# Patient Record
Sex: FEMALE | Race: OTHER | NOT HISPANIC OR LATINO
[De-identification: names, ages, dates, MRNs, and addresses within clinical notes are randomized per-mention and may not be internally consistent; named-entity substitution may affect disease eponyms.]

---

## 2024-01-04 ENCOUNTER — APPOINTMENT (OUTPATIENT)
Dept: HEART AND VASCULAR | Facility: CLINIC | Age: 54
End: 2024-01-04
Payer: COMMERCIAL

## 2024-01-04 VITALS
SYSTOLIC BLOOD PRESSURE: 154 MMHG | BODY MASS INDEX: 23.31 KG/M2 | HEIGHT: 64 IN | WEIGHT: 136.5 LBS | DIASTOLIC BLOOD PRESSURE: 100 MMHG | HEART RATE: 92 BPM | OXYGEN SATURATION: 98 %

## 2024-01-04 VITALS — DIASTOLIC BLOOD PRESSURE: 90 MMHG | SYSTOLIC BLOOD PRESSURE: 137 MMHG

## 2024-01-04 VITALS — SYSTOLIC BLOOD PRESSURE: 134 MMHG | DIASTOLIC BLOOD PRESSURE: 98 MMHG

## 2024-01-04 DIAGNOSIS — Z85.850 PERSONAL HISTORY OF MALIGNANT NEOPLASM OF THYROID: ICD-10-CM

## 2024-01-04 DIAGNOSIS — I10 ESSENTIAL (PRIMARY) HYPERTENSION: ICD-10-CM

## 2024-01-04 DIAGNOSIS — E78.5 HYPERLIPIDEMIA, UNSPECIFIED: ICD-10-CM

## 2024-01-04 PROBLEM — Z00.00 ENCOUNTER FOR PREVENTIVE HEALTH EXAMINATION: Status: ACTIVE | Noted: 2024-01-04

## 2024-01-04 PROCEDURE — 93000 ELECTROCARDIOGRAM COMPLETE: CPT

## 2024-01-04 PROCEDURE — 99205 OFFICE O/P NEW HI 60 MIN: CPT | Mod: 25

## 2024-01-04 PROCEDURE — 36415 COLL VENOUS BLD VENIPUNCTURE: CPT

## 2024-01-04 RX ORDER — LEVOTHYROXINE SODIUM 137 UG/1
TABLET ORAL
Refills: 0 | Status: ACTIVE | COMMUNITY

## 2024-01-04 RX ORDER — NIFEDIPINE 30 MG
30 TABLET, EXTENDED RELEASE ORAL
Refills: 0 | Status: DISCONTINUED | COMMUNITY
End: 2024-01-04

## 2024-01-04 NOTE — HISTORY OF PRESENT ILLNESS
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

## 2024-01-04 NOTE — PHYSICAL EXAM
[Well Developed] : well developed [Well Nourished] : well nourished [No Acute Distress] : no acute distress [Normal Conjunctiva] : normal conjunctiva [Normal Venous Pressure] : normal venous pressure [No Carotid Bruit] : no carotid bruit [Normal S1, S2] : normal S1, S2 [No Rub] : no rub [No Gallop] : no gallop [Clear Lung Fields] : clear lung fields [Good Air Entry] : good air entry [No Respiratory Distress] : no respiratory distress  [Soft] : abdomen soft [Non Tender] : non-tender [No Masses/organomegaly] : no masses/organomegaly [Normal Bowel Sounds] : normal bowel sounds [Normal Gait] : normal gait [No Edema] : no edema [No Cyanosis] : no cyanosis [No Clubbing] : no clubbing [No Varicosities] : no varicosities [No Rash] : no rash [No Skin Lesions] : no skin lesions [Moves all extremities] : moves all extremities [No Focal Deficits] : no focal deficits [Normal Speech] : normal speech [Alert and Oriented] : alert and oriented [Normal memory] : normal memory [de-identified] : 2/6 raudel quintero

## 2024-01-04 NOTE — DISCUSSION/SUMMARY
[Patient] : the patient [EKG obtained to assist in diagnosis and management of assessed problem(s)] : EKG obtained to assist in diagnosis and management of assessed problem(s) [___ Week(s)] : in [unfilled] week(s) [FreeTextEntry1] : 54 y/o female with h/o hl, thyroid cancer s/p thyroidectomy, htn,family h/o early cvd who presents for initial evaluation today  -ordered ekg today -  nsr, possible lvh, no st/t changes -will review recent testing from outside cards office -ordered calcium score -ordered echo to f/up valve disease -Stress echo 2022 - wnl -Echo 2022: ef 60-65%, grade 1 diastolic dysfunction, mild ai/mr, trace tr -continue chlorthalidone, dc nifedipine, start norvasc and telmisartan -labs ordered today -agree w endo eval for thryoid, on synthroid now -counseled on cvd risk factors -f/up 2-3 weeks for bmp, bp  I have spent 60 minutes reviewing labs, records, tsts and discussed cvd risk factors, htn

## 2024-01-07 LAB
ALBUMIN SERPL ELPH-MCNC: 4.6 G/DL
ALP BLD-CCNC: 99 U/L
ALT SERPL-CCNC: 34 U/L
ANION GAP SERPL CALC-SCNC: 12 MMOL/L
APPEARANCE: ABNORMAL
AST SERPL-CCNC: 24 U/L
BACTERIA: NEGATIVE /HPF
BASOPHILS # BLD AUTO: 0 K/UL
BASOPHILS NFR BLD AUTO: 0 %
BILIRUB SERPL-MCNC: 0.4 MG/DL
BILIRUBIN URINE: NEGATIVE
BLOOD URINE: NEGATIVE
BUN SERPL-MCNC: 19 MG/DL
CALCIUM SERPL-MCNC: 9.8 MG/DL
CAST: 2 /LPF
CHLORIDE SERPL-SCNC: 98 MMOL/L
CHOLEST SERPL-MCNC: 217 MG/DL
CO2 SERPL-SCNC: 29 MMOL/L
COLOR: YELLOW
CREAT SERPL-MCNC: 0.87 MG/DL
CREAT SPEC-SCNC: 50 MG/DL
EGFR: 81 ML/MIN/1.73M2
EOSINOPHIL # BLD AUTO: 0 K/UL
EOSINOPHIL NFR BLD AUTO: 0 %
EPITHELIAL CELLS: 1 /HPF
ESTIMATED AVERAGE GLUCOSE: 105 MG/DL
GLUCOSE QUALITATIVE U: NEGATIVE MG/DL
GLUCOSE SERPL-MCNC: 97 MG/DL
HBA1C MFR BLD HPLC: 5.3 %
HCT VFR BLD CALC: 39.5 %
HDLC SERPL-MCNC: 68 MG/DL
HGB BLD-MCNC: 14 G/DL
IMM GRANULOCYTES NFR BLD AUTO: 0.2 %
KETONES URINE: NEGATIVE MG/DL
LDLC SERPL CALC-MCNC: 127 MG/DL
LEUKOCYTE ESTERASE URINE: NEGATIVE
LYMPHOCYTES # BLD AUTO: 1.82 K/UL
LYMPHOCYTES NFR BLD AUTO: 33.5 %
MAGNESIUM SERPL-MCNC: 2.1 MG/DL
MAN DIFF?: NORMAL
MCHC RBC-ENTMCNC: 31.7 PG
MCHC RBC-ENTMCNC: 35.4 GM/DL
MCV RBC AUTO: 89.6 FL
MICROALBUMIN 24H UR DL<=1MG/L-MCNC: <1.2 MG/DL
MICROALBUMIN/CREAT 24H UR-RTO: NORMAL MG/G
MICROSCOPIC-UA: NORMAL
MONOCYTES # BLD AUTO: 0.44 K/UL
MONOCYTES NFR BLD AUTO: 8.1 %
NEUTROPHILS # BLD AUTO: 3.17 K/UL
NEUTROPHILS NFR BLD AUTO: 58.2 %
NITRITE URINE: NEGATIVE
NONHDLC SERPL-MCNC: 149 MG/DL
PH URINE: 7.5
PLATELET # BLD AUTO: 220 K/UL
POTASSIUM SERPL-SCNC: 4.3 MMOL/L
PROT SERPL-MCNC: 8.1 G/DL
PROTEIN URINE: NEGATIVE MG/DL
RBC # BLD: 4.41 M/UL
RBC # FLD: 12.8 %
RED BLOOD CELLS URINE: 1 /HPF
SODIUM SERPL-SCNC: 139 MMOL/L
SPECIFIC GRAVITY URINE: 1.01
TRIGL SERPL-MCNC: 128 MG/DL
TSH SERPL-ACNC: 1.1 UIU/ML
UROBILINOGEN URINE: 0.2 MG/DL
WBC # FLD AUTO: 5.44 K/UL
WHITE BLOOD CELLS URINE: 0 /HPF

## 2024-01-08 LAB — APO LP(A) SERPL-MCNC: 148.4 NMOL/L

## 2024-01-09 ENCOUNTER — RX RENEWAL (OUTPATIENT)
Age: 54
End: 2024-01-09

## 2024-01-19 ENCOUNTER — APPOINTMENT (OUTPATIENT)
Dept: HEART AND VASCULAR | Facility: CLINIC | Age: 54
End: 2024-01-19
Payer: COMMERCIAL

## 2024-01-19 VITALS — OXYGEN SATURATION: 99 % | DIASTOLIC BLOOD PRESSURE: 85 MMHG | SYSTOLIC BLOOD PRESSURE: 132 MMHG | HEART RATE: 68 BPM

## 2024-01-19 DIAGNOSIS — I35.1 NONRHEUMATIC AORTIC (VALVE) INSUFFICIENCY: ICD-10-CM

## 2024-01-19 PROCEDURE — 93306 TTE W/DOPPLER COMPLETE: CPT

## 2024-01-22 PROBLEM — I35.1 MILD AORTIC INSUFFICIENCY: Status: ACTIVE | Noted: 2024-01-22

## 2024-01-25 ENCOUNTER — RX RENEWAL (OUTPATIENT)
Age: 54
End: 2024-01-25

## 2024-01-25 ENCOUNTER — APPOINTMENT (OUTPATIENT)
Dept: HEART AND VASCULAR | Facility: CLINIC | Age: 54
End: 2024-01-25
Payer: COMMERCIAL

## 2024-01-25 VITALS
WEIGHT: 138 LBS | HEIGHT: 64 IN | DIASTOLIC BLOOD PRESSURE: 87 MMHG | BODY MASS INDEX: 23.56 KG/M2 | HEART RATE: 75 BPM | OXYGEN SATURATION: 98 % | SYSTOLIC BLOOD PRESSURE: 124 MMHG

## 2024-01-25 PROCEDURE — 99214 OFFICE O/P EST MOD 30 MIN: CPT | Mod: 25

## 2024-01-25 PROCEDURE — 93000 ELECTROCARDIOGRAM COMPLETE: CPT

## 2024-01-25 PROCEDURE — 36415 COLL VENOUS BLD VENIPUNCTURE: CPT

## 2024-01-26 LAB
ANION GAP SERPL CALC-SCNC: 11 MMOL/L
BUN SERPL-MCNC: 24 MG/DL
CALCIUM SERPL-MCNC: 9.1 MG/DL
CHLORIDE SERPL-SCNC: 100 MMOL/L
CO2 SERPL-SCNC: 28 MMOL/L
CREAT SERPL-MCNC: 0.88 MG/DL
EGFR: 80 ML/MIN/1.73M2
GLUCOSE SERPL-MCNC: 108 MG/DL
POTASSIUM SERPL-SCNC: 3.7 MMOL/L
SODIUM SERPL-SCNC: 139 MMOL/L

## 2024-02-02 ENCOUNTER — RESULT REVIEW (OUTPATIENT)
Age: 54
End: 2024-02-02

## 2024-02-08 RX ORDER — ASPIRIN ENTERIC COATED TABLETS 81 MG 81 MG/1
81 TABLET, DELAYED RELEASE ORAL DAILY
Qty: 90 | Refills: 3 | Status: ACTIVE | COMMUNITY
Start: 2024-02-08 | End: 1900-01-01

## 2024-02-12 ENCOUNTER — RESULT REVIEW (OUTPATIENT)
Age: 54
End: 2024-02-12

## 2024-02-12 ENCOUNTER — APPOINTMENT (OUTPATIENT)
Dept: HEART AND VASCULAR | Facility: CLINIC | Age: 54
End: 2024-02-12
Payer: COMMERCIAL

## 2024-02-12 VITALS
DIASTOLIC BLOOD PRESSURE: 72 MMHG | SYSTOLIC BLOOD PRESSURE: 147 MMHG | HEIGHT: 64 IN | TEMPERATURE: 97.8 F | OXYGEN SATURATION: 98 % | HEART RATE: 73 BPM | BODY MASS INDEX: 23.56 KG/M2 | WEIGHT: 138 LBS

## 2024-02-12 VITALS — DIASTOLIC BLOOD PRESSURE: 81 MMHG | SYSTOLIC BLOOD PRESSURE: 128 MMHG

## 2024-02-12 VITALS — DIASTOLIC BLOOD PRESSURE: 88 MMHG | SYSTOLIC BLOOD PRESSURE: 135 MMHG

## 2024-02-12 VITALS — DIASTOLIC BLOOD PRESSURE: 80 MMHG | SYSTOLIC BLOOD PRESSURE: 132 MMHG

## 2024-02-12 PROCEDURE — 99214 OFFICE O/P EST MOD 30 MIN: CPT

## 2024-02-12 NOTE — DISCUSSION/SUMMARY
[Patient] : the patient [___ Month(s)] : in [unfilled] month(s) [FreeTextEntry1] : 52 y/o female with h/o hl, thyroid cancer s/p thyroidectomy, htn, family h/o early cvd, pad who presents for f/up today  -secondary w/up htn labs ordered -SEBASTIÁN duplex ordered -ike evaluation referral -Calcium score 2/24 IMPRESSION: Cardiac: 1.  The calcium score is 0   Agatston units . 2.  Mild aortic calcification. Non-cardiac: No significant abnormality seen.  -given pad, elevated LpA and  and family h/o cvd would continue asa, statin -will need lipids 5/24 -Echo 1/24 1. Left ventricular cavity is normal. Left ventricular systolic function is normal with an ejection fraction of 66 % by Ugarte's method of disks. There are no regional wall motion abnormalities seen. 2. Normal left ventricular diastolic function. 3. Normal right ventricular cavity size, wall thickness, and normal systolic function. 4. Mild left ventricular hypertrophy. 5. Mild mitral regurgitation. 6. Mild to moderate aortic regurgitation. 7. No pericardial effusion seen.  -serial echo to f/up AR 1/25 -ekg 1/24 - nsr, lad, possible lvh, no st/t changes -Stress echo 2022 - wnl -Echo 2022: ef 60-65%, grade 1 diastolic dysfunction, mild ai/mr, trace tr -continue chlorthalidone, norvasc and telmisartan  -labs 2024 reviewed, bmp ordered -elevated LpA -agree w endo eval for thryoid, on synthroid now -counseled on cvd risk factors -f/up 2-3 months for lipids, htn  I have spent 30 minutes reviewing labs, records, tests and discussed cvd risk factors, htn.

## 2024-02-12 NOTE — HISTORY OF PRESENT ILLNESS
[FreeTextEntry1] : 54 y/o female with h/o hl, thyroid cancer s/p thyroidectomy, htn, family h/o early cvd, pad who presents for f/up today.   last seen  telmisartan increased to 80 asa, lipitor added   -Calcium score  IMPRESSION: Cardiac: 1.  The calcium score is 0   Agatston units . 2.  Mild aortic calcification. Non-cardiac: No significant abnormality seen.  -Echo  1. Left ventricular cavity is normal. Left ventricular systolic function is normal with an ejection fraction of 66 % by Ugarte's method of disks. There are no regional wall motion abnormalities seen. 2. Normal left ventricular diastolic function. 3. Normal right ventricular cavity size, wall thickness, and normal systolic function. 4. Mild left ventricular hypertrophy. 5. Mild mitral regurgitation. 6. Mild to moderate aortic regurgitation. 7. No pericardial effusion seen.  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

## 2024-02-26 ENCOUNTER — APPOINTMENT (OUTPATIENT)
Dept: ULTRASOUND IMAGING | Facility: HOSPITAL | Age: 54
End: 2024-02-26
Payer: COMMERCIAL

## 2024-02-26 ENCOUNTER — OUTPATIENT (OUTPATIENT)
Dept: OUTPATIENT SERVICES | Facility: HOSPITAL | Age: 54
LOS: 1 days | End: 2024-02-26
Payer: COMMERCIAL

## 2024-02-26 ENCOUNTER — RESULT REVIEW (OUTPATIENT)
Age: 54
End: 2024-02-26

## 2024-02-26 PROCEDURE — 93976 VASCULAR STUDY: CPT

## 2024-02-26 PROCEDURE — 93976 VASCULAR STUDY: CPT | Mod: 26

## 2024-02-26 PROCEDURE — 76775 US EXAM ABDO BACK WALL LIM: CPT

## 2024-02-26 PROCEDURE — 76775 US EXAM ABDO BACK WALL LIM: CPT | Mod: 26,59

## 2024-04-11 ENCOUNTER — APPOINTMENT (OUTPATIENT)
Dept: HEART AND VASCULAR | Facility: CLINIC | Age: 54
End: 2024-04-11
Payer: COMMERCIAL

## 2024-04-11 VITALS
OXYGEN SATURATION: 97 % | TEMPERATURE: 97.6 F | WEIGHT: 137 LBS | DIASTOLIC BLOOD PRESSURE: 84 MMHG | HEART RATE: 79 BPM | SYSTOLIC BLOOD PRESSURE: 142 MMHG | HEIGHT: 63 IN | BODY MASS INDEX: 24.27 KG/M2

## 2024-04-11 VITALS — SYSTOLIC BLOOD PRESSURE: 126 MMHG | DIASTOLIC BLOOD PRESSURE: 84 MMHG

## 2024-04-11 DIAGNOSIS — I73.9 PERIPHERAL VASCULAR DISEASE, UNSPECIFIED: ICD-10-CM

## 2024-04-11 PROCEDURE — 36415 COLL VENOUS BLD VENIPUNCTURE: CPT

## 2024-04-11 PROCEDURE — 99214 OFFICE O/P EST MOD 30 MIN: CPT | Mod: 25

## 2024-04-11 RX ORDER — CHLORTHALIDONE 25 MG/1
25 TABLET ORAL DAILY
Qty: 90 | Refills: 3 | Status: ACTIVE | COMMUNITY
Start: 1900-01-01 | End: 1900-01-01

## 2024-04-11 NOTE — DISCUSSION/SUMMARY
[Patient] : the patient [___ Month(s)] : in [unfilled] month(s) [FreeTextEntry1] : 54 y/o female with h/o hl, thyroid cancer s/p thyroidectomy, htn, family h/o early cvd, pad who presents for f/up today  -restart chlorthalidone (notes ankle swelling on higher dose norvasc) -secondary w/up htn labs ordered -SEBASTIÁN duplex 2/24: normal -ike evaluation referral -Calcium score 2/24 IMPRESSION: Cardiac: 1. The calcium score is 0 Agatston units. 2. Mild aortic calcification. Non-cardiac: No significant abnormality seen.  -given pad, elevated LpA and  and family h/o cvd would continue asa, statin -will need lipids - ordered today -Echo 1/24 1. Left ventricular cavity is normal. Left ventricular systolic function is normal with an ejection fraction of 66 % by Ugarte's method of disks. There are no regional wall motion abnormalities seen. 2. Normal left ventricular diastolic function. 3. Normal right ventricular cavity size, wall thickness, and normal systolic function. 4. Mild left ventricular hypertrophy. 5. Mild mitral regurgitation. 6. Mild to moderate aortic regurgitation. 7. No pericardial effusion seen.  -serial echo to f/up AR 1/25 -ekg 1/24 - nsr, lad, possible lvh, no st/t changes -Stress echo 2022 - wnl -Echo 2022: ef 60-65%, grade 1 diastolic dysfunction, mild ai/mr, trace tr -continue norvasc and telmisartan -labs 2024 reviewed  -elevated LpA -agree w endo eval for thryoid, on synthroid now -counseled on cvd risk factors -f/up 2-3 months for lipids, htn  I have spent 30 minutes reviewing labs, records, tests and discussed cvd risk factors, htn.

## 2024-04-11 NOTE — HISTORY OF PRESENT ILLNESS
[FreeTextEntry1] : 52 y/o female with h/o hl, thyroid cancer s/p thyroidectomy, htn, family h/o early cvd, pad who presents for f/up today.   last seen    notes she has not been taking chlorthalidone  no cp, sob, syncope, lh, palpitations, edema, orthopnea, pnd  has not had ike eval  notes prior ankle swelling on higher norvasc  -SEBASTIÁN duplex : normal  -Calcium score  IMPRESSION: Cardiac: 1. The calcium score is 0 Agatston units . 2. Mild aortic calcification. Non-cardiac: No significant abnormality seen.  -Echo  1. Left ventricular cavity is normal. Left ventricular systolic function is normal with an ejection fraction of 66 % by Ugarte's method of disks. There are no regional wall motion abnormalities seen. 2. Normal left ventricular diastolic function. 3. Normal right ventricular cavity size, wall thickness, and normal systolic function. 4. Mild left ventricular hypertrophy. 5. Mild mitral regurgitation. 6. Mild to moderate aortic regurgitation. 7. No pericardial effusion seen.     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

## 2024-04-14 LAB
CHOLEST SERPL-MCNC: 157 MG/DL
HDLC SERPL-MCNC: 60 MG/DL
LDLC SERPL CALC-MCNC: 82 MG/DL
NONHDLC SERPL-MCNC: 97 MG/DL
TRIGL SERPL-MCNC: 76 MG/DL

## 2024-04-15 RX ORDER — ATORVASTATIN CALCIUM 40 MG/1
40 TABLET, FILM COATED ORAL
Qty: 1 | Refills: 1 | Status: ACTIVE | COMMUNITY
Start: 2024-01-25 | End: 1900-01-01

## 2024-05-16 ENCOUNTER — RX RENEWAL (OUTPATIENT)
Age: 54
End: 2024-05-16

## 2024-05-16 RX ORDER — AMLODIPINE BESYLATE 5 MG/1
5 TABLET ORAL DAILY
Qty: 90 | Refills: 1 | Status: ACTIVE | COMMUNITY
Start: 2024-01-04 | End: 1900-01-01

## 2024-05-16 RX ORDER — TELMISARTAN 80 MG/1
80 TABLET ORAL DAILY
Qty: 90 | Refills: 1 | Status: ACTIVE | COMMUNITY
Start: 2024-01-04 | End: 1900-01-01

## 2024-06-13 ENCOUNTER — APPOINTMENT (OUTPATIENT)
Dept: HEART AND VASCULAR | Facility: CLINIC | Age: 54
End: 2024-06-13
Payer: COMMERCIAL

## 2024-06-13 VITALS
HEIGHT: 63 IN | OXYGEN SATURATION: 98 % | WEIGHT: 138 LBS | BODY MASS INDEX: 24.45 KG/M2 | SYSTOLIC BLOOD PRESSURE: 121 MMHG | TEMPERATURE: 95.6 F | DIASTOLIC BLOOD PRESSURE: 74 MMHG | HEART RATE: 65 BPM

## 2024-06-13 PROCEDURE — 36415 COLL VENOUS BLD VENIPUNCTURE: CPT

## 2024-06-13 PROCEDURE — 99214 OFFICE O/P EST MOD 30 MIN: CPT | Mod: 25

## 2024-06-13 NOTE — DISCUSSION/SUMMARY
[Patient] : the patient [___ Month(s)] : in [unfilled] month(s) [FreeTextEntry1] : 55 y/o female with h/o hl, thyroid cancer s/p thyroidectomy, htn, family h/o early cvd, pad who presents for f/up today  -continue chlorthalidone (notes ankle swelling on higher dose norvasc) -secondary w/up htn labs ordered again -SEBASTIÁN duplex 2/24: normal -ike evaluation referral - pending next month -Calcium score 2/24 IMPRESSION: Cardiac: 1. The calcium score is 0 Agatston units. 2. Mild aortic calcification. Non-cardiac: No significant abnormality seen.  -given pad, elevated LpA and  and family h/o cvd would continue asa, statin   -Echo 1/24 1. Left ventricular cavity is normal. Left ventricular systolic function is normal with an ejection fraction of 66 % by Ugarte's method of disks. There are no regional wall motion abnormalities seen. 2. Normal left ventricular diastolic function. 3. Normal right ventricular cavity size, wall thickness, and normal systolic function. 4. Mild left ventricular hypertrophy. 5. Mild mitral regurgitation. 6. Mild to moderate aortic regurgitation. 7. No pericardial effusion seen.  -serial echo to f/up AR 1/25 -ekg 1/24 - nsr, lad, possible lvh, no st/t changes -Stress echo 2022 - wnl -Echo 2022: ef 60-65%, grade 1 diastolic dysfunction, mild ai/mr, trace tr -continue norvasc and telmisartan -labs 2024 reviewed, lipids ordered today -elevated LpA -agree w endo eval for thryoid, on synthroid now -counseled on cvd risk factors -f/up 4 months for lipids, htn  I have spent 30 minutes reviewing labs, records, tests and discussed cvd risk factors, htn.

## 2024-06-13 NOTE — HISTORY OF PRESENT ILLNESS
[FreeTextEntry1] : 53 y/o female with h/o hl, thyroid cancer s/p thyroidectomy, htn, family h/o early cvd, pad who presents for f/up today.   last seen  chlorthalidone restarted secondary htn labs ordered - not done lipitor increased to 40  no cp, sob, syncope, lh, palpitations, edema, orthopnea, pnd  has not had ike eval  notes prior ankle swelling on higher norvasc  -SEBASTIÁN duplex : normal  -Calcium score  IMPRESSION: Cardiac: 1. The calcium score is 0 Agatston units . 2. Mild aortic calcification. Non-cardiac: No significant abnormality seen.  -Echo  1. Left ventricular cavity is normal. Left ventricular systolic function is normal with an ejection fraction of 66 % by Ugarte's method of disks. There are no regional wall motion abnormalities seen. 2. Normal left ventricular diastolic function. 3. Normal right ventricular cavity size, wall thickness, and normal systolic function. 4. Mild left ventricular hypertrophy. 5. Mild mitral regurgitation. 6. Mild to moderate aortic regurgitation. 7. No pericardial effusion seen.    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

## 2024-06-15 LAB
ANION GAP SERPL CALC-SCNC: 13 MMOL/L
BUN SERPL-MCNC: 14 MG/DL
CALCIUM SERPL-MCNC: 9.8 MG/DL
CALCIUM SERPL-MCNC: 9.8 MG/DL
CHLORIDE SERPL-SCNC: 96 MMOL/L
CHOLEST SERPL-MCNC: 134 MG/DL
CO2 SERPL-SCNC: 26 MMOL/L
CREAT SERPL-MCNC: 0.76 MG/DL
EGFR: 94 ML/MIN/1.73M2
GLUCOSE SERPL-MCNC: 89 MG/DL
HDLC SERPL-MCNC: 60 MG/DL
LDLC SERPL CALC-MCNC: 60 MG/DL
MAGNESIUM SERPL-MCNC: 1.7 MG/DL
NONHDLC SERPL-MCNC: 74 MG/DL
PARATHYROID HORMONE INTACT: 15 PG/ML
POTASSIUM SERPL-SCNC: 4.4 MMOL/L
SODIUM SERPL-SCNC: 135 MMOL/L
TRIGL SERPL-MCNC: 67 MG/DL

## 2024-06-16 ENCOUNTER — NON-APPOINTMENT (OUTPATIENT)
Age: 54
End: 2024-06-16

## 2024-06-18 LAB — ALDOSTERONE SERUM: 10.2 NG/DL

## 2024-06-26 LAB
CORTICOSTEROID BIND GLOBULIN: 2.7 MG/DL
CORTIS SERPL-MCNC: 6.5 UG/DL
CORTISOL, FREE: 0.19 UG/DL
PFCX: 2.9 %

## 2024-07-30 ENCOUNTER — APPOINTMENT (OUTPATIENT)
Dept: PULMONOLOGY | Facility: CLINIC | Age: 54
End: 2024-07-30
Payer: COMMERCIAL

## 2024-07-30 VITALS
WEIGHT: 135 LBS | TEMPERATURE: 97.7 F | DIASTOLIC BLOOD PRESSURE: 80 MMHG | HEART RATE: 74 BPM | BODY MASS INDEX: 23.92 KG/M2 | HEIGHT: 63 IN | OXYGEN SATURATION: 97 % | SYSTOLIC BLOOD PRESSURE: 128 MMHG

## 2024-07-30 DIAGNOSIS — R06.83 SNORING: ICD-10-CM

## 2024-07-30 DIAGNOSIS — E78.5 HYPERLIPIDEMIA, UNSPECIFIED: ICD-10-CM

## 2024-07-30 DIAGNOSIS — I10 ESSENTIAL (PRIMARY) HYPERTENSION: ICD-10-CM

## 2024-07-30 PROCEDURE — 99204 OFFICE O/P NEW MOD 45 MIN: CPT

## 2024-07-30 PROCEDURE — G2211 COMPLEX E/M VISIT ADD ON: CPT | Mod: NC

## 2024-07-31 NOTE — HISTORY OF PRESENT ILLNESS
[FreeTextEntry1] : The patient is a 54 year old F with PMHx of HLD, thyroid cancer s/p thyroidectomy, HTN, referred by Dr. Rodriguez for GLORIA evaluation. Endorses snoring. No witnessed apneas, no nightly awakenings, no un-refreshing sleep or daytime sleepiness. No family hx of GLORIA. Patient's partner has GLORIA.   [ESS] : 3

## 2024-07-31 NOTE — CONSULT LETTER
[Dear  ___] : Dear  [unfilled], [Consult Letter:] : I had the pleasure of evaluating your patient, [unfilled]. [Please see my note below.] : Please see my note below. [Consult Closing:] : Thank you very much for allowing me to participate in the care of this patient.  If you have any questions, please do not hesitate to contact me. [Sincerely,] : Sincerely, [FreeTextEntry3] : Deisi Ohara MD  [employer] at [facility] Pulmonary, Critical Care, and Sleep Medicine [DrBonnie  ___] : Dr. MARAVILLA head

## 2024-07-31 NOTE — CONSULT LETTER
[Dear  ___] : Dear  [unfilled], [Consult Letter:] : I had the pleasure of evaluating your patient, [unfilled]. [Please see my note below.] : Please see my note below. [Consult Closing:] : Thank you very much for allowing me to participate in the care of this patient.  If you have any questions, please do not hesitate to contact me. [Sincerely,] : Sincerely, [FreeTextEntry3] : Deisi Ohara MD  [employer] at [facility] Pulmonary, Critical Care, and Sleep Medicine [DrBonnie  ___] : Dr. MARAVILLA

## 2024-07-31 NOTE — REVIEW OF SYSTEMS
[Snoring] : snoring [Negative] : Psychiatric [Witnessed Apneas] : no witnessed apnea [Lower Extremity Discomfort] : no lower extremity discomfort [Late day/ Evening symptoms] : no late day/evening symptoms [Unusual Sleep Behavior] : no unusual sleep behavior [Hypersomnolence] : not sleeping much more than usual [Cataplexy] :  no cataplexy

## 2024-07-31 NOTE — ASSESSMENT
[FreeTextEntry1] : The patient is a 54 year old F with PMHx of HLD, thyroid cancer s/p thyroidectomy, HTN, referred by Dr. Rodriguez for GLORIA evaluation. The patient has multiple signs and symptoms of sleep disordered breathing including snoring and htn. Morbidity and mortality associated with GLORIA as well as treatment options were reviewed.   Follow-up after HST.

## 2024-07-31 NOTE — PHYSICAL EXAM
[General Appearance - Well Developed] : well developed [General Appearance - Well Nourished] : well nourished [Neck Appearance] : the appearance of the neck was normal [] : no respiratory distress [Skin Color & Pigmentation] : normal skin color and pigmentation [Oriented To Time, Place, And Person] : oriented to person, place, and time [Affect] : the affect was normal [Normal Conjunctiva] : the conjunctiva exhibited no abnormalities [Abnormal Walk] : normal gait

## 2024-07-31 NOTE — END OF VISIT
[FreeTextEntry3] :   I, Deisi Ohara MD, personally performed the evaluation and management (E/M) services for this patient. That E/M includes conducting the clinically appropriate initial history &/or exam, assessing all conditions, and establishing the plan of care. I have also independently reviewed the medical records and imaging at the time of this office visit, and discussed the above mentioned images with interpretations with the patient. Today, DALE Moon was here to participate in the visit & follow plan of care established by me.

## 2024-07-31 NOTE — HISTORY OF PRESENT ILLNESS
[FreeTextEntry1] : The patient is a 54 year old F with PMHx of HLD, thyroid cancer s/p thyroidectomy, HTN, referred by Dr. Rodrgiuez for GLORIA evaluation. Endorses snoring. No witnessed apneas, no nightly awakenings, no un-refreshing sleep or daytime sleepiness. No family hx of GLORIA. Patient's partner has GLORIA.   [ESS] : 3

## 2024-08-20 ENCOUNTER — APPOINTMENT (OUTPATIENT)
Dept: SLEEP CENTER | Facility: HOME HEALTH | Age: 54
End: 2024-08-20

## 2024-08-20 ENCOUNTER — OUTPATIENT (OUTPATIENT)
Dept: OUTPATIENT SERVICES | Facility: HOSPITAL | Age: 54
LOS: 1 days | End: 2024-08-20

## 2024-08-20 PROCEDURE — 95800 SLP STDY UNATTENDED: CPT

## 2024-08-20 PROCEDURE — 95800 SLP STDY UNATTENDED: CPT | Mod: 26

## 2024-08-22 DIAGNOSIS — G47.33 OBSTRUCTIVE SLEEP APNEA (ADULT) (PEDIATRIC): ICD-10-CM

## 2024-08-30 ENCOUNTER — APPOINTMENT (OUTPATIENT)
Dept: PULMONOLOGY | Facility: CLINIC | Age: 54
End: 2024-08-30
Payer: COMMERCIAL

## 2024-08-30 DIAGNOSIS — G47.33 OBSTRUCTIVE SLEEP APNEA (ADULT) (PEDIATRIC): ICD-10-CM

## 2024-08-30 PROCEDURE — 99442: CPT

## 2024-08-30 PROCEDURE — G2211 COMPLEX E/M VISIT ADD ON: CPT | Mod: NC

## 2024-08-30 NOTE — HISTORY OF PRESENT ILLNESS
[Home] : at home, [unfilled] , at the time of the visit. [Medical Office: ([facility])___] : at the medical office located in  [Verbal consent obtained from patient] : the patient, [unfilled] [FreeTextEntry1] : The patient is a 54 year old F with PMHx of HLD, thyroid cancer s/p thyroidectomy, HTN, referred by Dr. Rodriguez for GLORIA evaluation. Endorses snoring. No witnessed apneas, no nightly awakenings, no un-refreshing sleep or daytime sleepiness. No family hx of GLORIA. Patient's partner has GLORIA.  8/30/2024 Had HST, here to discuss results. [ESS] : 3

## 2024-08-30 NOTE — HISTORY OF PRESENT ILLNESS
[Home] : at home, [unfilled] , at the time of the visit. [Medical Office: ([facility])___] : at the medical office located in  [Verbal consent obtained from patient] : the patient, [unfilled] [FreeTextEntry1] : The patient is a 54 year old F with PMHx of HLD, thyroid cancer s/p thyroidectomy, HTN, referred by Dr. Rodriguez for GLORIA evaluation. Endorses snoring. No witnessed apneas, no nightly awakenings, no un-refreshing sleep or daytime sleepiness. No family hx of GLORIA. Patient's partner has GLORIA.  8/30/2024 Had HST, here to discuss results. [ESS] : 3

## 2024-08-30 NOTE — ASSESSMENT
[FreeTextEntry1] : HST 8/20/2024: AHI 14.7 (3%); AHI 6.6 (4%); t88 0.1 minutes  The patient is a 54-year-old F with PMHx of HLD, thyroid cancer s/p thyroidectomy, HTN, referred by Dr. Rodriguez for GLORIA evaluation. The patient has multiple signs and symptoms of sleep disordered breathing including snoring and htn.  HST showed mild GLORIA. Reviewed that home sleep testing likely underestimates severity of GLORIA; her GLORIA likely in moderate range. The benefits of GLORIA treatment in improving cardiac, neurologic, cognitive, and mortality outcomes were discussed. Treatment options including PAP and MAD reviewed. LMN, dental referral list, and HST mailed to the patient today. Advised to reach out for repeat HST once oral appliance is made to ensure efficacy.

## 2024-08-30 NOTE — CONSULT LETTER
[Dear  ___] : Dear  [unfilled], [Courtesy Letter:] : I had the pleasure of seeing your patient, [unfilled], in my office today. [Please see my note below.] : Please see my note below. [Sincerely,] : Sincerely, [Consult Closing:] : Thank you very much for allowing me to participate in the care of this patient.  If you have any questions, please do not hesitate to contact me. [FreeTextEntry3] : Jasmyne Garcia PA-C

## 2024-10-22 ENCOUNTER — APPOINTMENT (OUTPATIENT)
Dept: HEART AND VASCULAR | Facility: CLINIC | Age: 54
End: 2024-10-22
Payer: COMMERCIAL

## 2024-10-22 VITALS
DIASTOLIC BLOOD PRESSURE: 96 MMHG | BODY MASS INDEX: 24.8 KG/M2 | HEART RATE: 77 BPM | SYSTOLIC BLOOD PRESSURE: 134 MMHG | HEIGHT: 63 IN | OXYGEN SATURATION: 95 % | WEIGHT: 140 LBS | TEMPERATURE: 98.6 F

## 2024-10-22 VITALS — DIASTOLIC BLOOD PRESSURE: 86 MMHG | SYSTOLIC BLOOD PRESSURE: 122 MMHG

## 2024-10-22 PROCEDURE — 99214 OFFICE O/P EST MOD 30 MIN: CPT | Mod: 25

## 2024-10-22 PROCEDURE — 93000 ELECTROCARDIOGRAM COMPLETE: CPT

## 2025-03-24 ENCOUNTER — RX RENEWAL (OUTPATIENT)
Age: 55
End: 2025-03-24

## 2025-05-07 ENCOUNTER — APPOINTMENT (OUTPATIENT)
Dept: HEART AND VASCULAR | Facility: CLINIC | Age: 55
End: 2025-05-07
Payer: COMMERCIAL

## 2025-05-07 VITALS
HEART RATE: 63 BPM | TEMPERATURE: 97.8 F | HEIGHT: 63 IN | OXYGEN SATURATION: 96 % | WEIGHT: 137 LBS | BODY MASS INDEX: 24.27 KG/M2 | SYSTOLIC BLOOD PRESSURE: 117 MMHG | DIASTOLIC BLOOD PRESSURE: 75 MMHG

## 2025-05-07 PROCEDURE — 99214 OFFICE O/P EST MOD 30 MIN: CPT

## 2025-05-07 PROCEDURE — 93000 ELECTROCARDIOGRAM COMPLETE: CPT

## 2025-05-07 PROCEDURE — 36415 COLL VENOUS BLD VENIPUNCTURE: CPT

## 2025-05-08 LAB
ALBUMIN SERPL ELPH-MCNC: 4.4 G/DL
ALP BLD-CCNC: 77 U/L
ALT SERPL-CCNC: 34 U/L
ANION GAP SERPL CALC-SCNC: 15 MMOL/L
AST SERPL-CCNC: 24 U/L
BASOPHILS # BLD AUTO: 0 K/UL
BASOPHILS NFR BLD AUTO: 0 %
BILIRUB SERPL-MCNC: 0.4 MG/DL
BUN SERPL-MCNC: 13 MG/DL
CALCIUM SERPL-MCNC: 9.3 MG/DL
CHLORIDE SERPL-SCNC: 96 MMOL/L
CHOLEST SERPL-MCNC: 204 MG/DL
CO2 SERPL-SCNC: 26 MMOL/L
CREAT SERPL-MCNC: 0.69 MG/DL
CREAT SPEC-SCNC: 26 MG/DL
EGFRCR SERPLBLD CKD-EPI 2021: 104 ML/MIN/1.73M2
EOSINOPHIL # BLD AUTO: 0.01 K/UL
EOSINOPHIL NFR BLD AUTO: 0.2 %
ESTIMATED AVERAGE GLUCOSE: 103 MG/DL
GLUCOSE SERPL-MCNC: 88 MG/DL
HBA1C MFR BLD HPLC: 5.2 %
HCT VFR BLD CALC: 37.1 %
HDLC SERPL-MCNC: 65 MG/DL
HGB BLD-MCNC: 13 G/DL
IMM GRANULOCYTES NFR BLD AUTO: 0.2 %
LDLC SERPL-MCNC: 117 MG/DL
LYMPHOCYTES # BLD AUTO: 2.1 K/UL
LYMPHOCYTES NFR BLD AUTO: 39 %
MAGNESIUM SERPL-MCNC: 1.9 MG/DL
MAN DIFF?: NORMAL
MCHC RBC-ENTMCNC: 32.4 PG
MCHC RBC-ENTMCNC: 35 G/DL
MCV RBC AUTO: 92.5 FL
MICROALBUMIN 24H UR DL<=1MG/L-MCNC: <1.2 MG/DL
MICROALBUMIN/CREAT 24H UR-RTO: NORMAL MG/G
MONOCYTES # BLD AUTO: 0.35 K/UL
MONOCYTES NFR BLD AUTO: 6.5 %
NEUTROPHILS # BLD AUTO: 2.91 K/UL
NEUTROPHILS NFR BLD AUTO: 54.1 %
NONHDLC SERPL-MCNC: 140 MG/DL
PLATELET # BLD AUTO: 206 K/UL
POTASSIUM SERPL-SCNC: 4.5 MMOL/L
PROT SERPL-MCNC: 7.5 G/DL
RBC # BLD: 4.01 M/UL
RBC # FLD: 12.8 %
SODIUM SERPL-SCNC: 138 MMOL/L
TRIGL SERPL-MCNC: 126 MG/DL
TSH SERPL-ACNC: 3.85 UIU/ML
WBC # FLD AUTO: 5.38 K/UL

## 2025-05-23 ENCOUNTER — APPOINTMENT (OUTPATIENT)
Dept: HEART AND VASCULAR | Facility: CLINIC | Age: 55
End: 2025-05-23
Payer: COMMERCIAL

## 2025-05-23 DIAGNOSIS — I35.1 NONRHEUMATIC AORTIC (VALVE) INSUFFICIENCY: ICD-10-CM

## 2025-05-23 PROCEDURE — 93306 TTE W/DOPPLER COMPLETE: CPT
